# Patient Record
Sex: FEMALE | Race: ASIAN | NOT HISPANIC OR LATINO
[De-identification: names, ages, dates, MRNs, and addresses within clinical notes are randomized per-mention and may not be internally consistent; named-entity substitution may affect disease eponyms.]

---

## 2024-09-16 PROBLEM — Z00.00 ENCOUNTER FOR PREVENTIVE HEALTH EXAMINATION: Status: ACTIVE | Noted: 2024-09-16

## 2024-09-17 ENCOUNTER — APPOINTMENT (OUTPATIENT)
Dept: ORTHOPEDIC SURGERY | Facility: CLINIC | Age: 49
End: 2024-09-17
Payer: COMMERCIAL

## 2024-09-17 DIAGNOSIS — M96.0 PSEUDARTHROSIS AFTER FUSION OR ARTHRODESIS: ICD-10-CM

## 2024-09-17 DIAGNOSIS — M48.062 SPINAL STENOSIS, LUMBAR REGION WITH NEUROGENIC CLAUDICATION: ICD-10-CM

## 2024-09-17 PROCEDURE — 99204 OFFICE O/P NEW MOD 45 MIN: CPT | Mod: 57

## 2024-09-17 NOTE — PHYSICAL EXAM
[Motor Strength Lower Extremities] : left (5/5) [___/5] : left ([unfilled]/5) [] : Sensory: [L4-LT] : L4 [L5-LT] : L5 [S1-LT] : S1 [Normal] : Alert and in no acute distress

## 2024-09-17 NOTE — PHYSICAL EXAM
[Motor Strength Lower Extremities] : left (5/5) [___/5] : left ([unfilled]/5) [L4-LT] : L4 [] : Sensory: [L5-LT] : L5 [S1-LT] : S1 [Normal] : Alert and in no acute distress

## 2024-09-18 PROBLEM — M96.0 PSEUDARTHROSIS FOLLOWING SPINAL FUSION: Status: ACTIVE | Noted: 2024-09-18

## 2024-09-18 PROBLEM — M48.062 SPINAL STENOSIS OF LUMBAR REGION WITH NEUROGENIC CLAUDICATION: Status: ACTIVE | Noted: 2024-09-18

## 2024-09-18 NOTE — HISTORY OF PRESENT ILLNESS
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 The patient experiences daily pain. Her symptoms are thus far recalcitrant to the aforementioned surgery, conservative treatment, activity modification, and other. 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

## 2024-09-18 NOTE — PLAN
[TextEntry] : I recommended removal of hardware at L4-5, exploration of fusion, re-instrumentation L4-L5, decompression, fusion, and TLIF at L5-S1. The risk, alternatives, and benefits of which were explained to in detailed to the patient, including the two to three hour surgery, three night stay in the hospital, three weeks of being for the most part home bound, the use of the bone stimulator, thalamic surgery, as well as prolonged activity modification.

## 2024-09-18 NOTE — END OF VISIT
[FreeTextEntry3] : All medical record entries made by Leilani Padilla (scribe) were at my, Dr. Dallas Puckett, direction and personally dictated by me on 09/17/2024. I have reviewed the chart and agree that the record accurately reflects my personal performance of the history, physical exam, assessment, and plan. I have also personally directed, reviewed, and agreed with the chart.

## 2024-09-18 NOTE — HISTORY OF PRESENT ILLNESS
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 The patient experiences daily pain. Her symptoms are thus far recalcitrant to the aforementioned surgery, conservative treatment, activity modification, and other. 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

## 2024-09-19 ENCOUNTER — APPOINTMENT (OUTPATIENT)
Dept: ORTHOPEDIC SURGERY | Facility: CLINIC | Age: 49
End: 2024-09-19

## 2024-11-04 ENCOUNTER — APPOINTMENT (OUTPATIENT)
Dept: ORTHOPEDIC SURGERY | Facility: HOSPITAL | Age: 49
End: 2024-11-04

## 2024-11-19 ENCOUNTER — APPOINTMENT (OUTPATIENT)
Dept: ORTHOPEDIC SURGERY | Facility: CLINIC | Age: 49
End: 2024-11-19